# Patient Record
Sex: MALE | Race: WHITE | NOT HISPANIC OR LATINO | URBAN - METROPOLITAN AREA
[De-identification: names, ages, dates, MRNs, and addresses within clinical notes are randomized per-mention and may not be internally consistent; named-entity substitution may affect disease eponyms.]

---

## 2023-08-05 LAB
ALBUMIN (G/DL) IN SER/PLAS: 4.1 G/DL (ref 3.4–5)
ANION GAP IN SER/PLAS: 11 MMOL/L (ref 10–20)
BASOPHILS (10*3/UL) IN BLOOD BY AUTOMATED COUNT: 0.02 X10E9/L (ref 0–0.1)
BASOPHILS/100 LEUKOCYTES IN BLOOD BY AUTOMATED COUNT: 0.3 % (ref 0–2)
CALCIUM (MG/DL) IN SER/PLAS: 9.1 MG/DL (ref 8.6–10.3)
CARBON DIOXIDE, TOTAL (MMOL/L) IN SER/PLAS: 32 MMOL/L (ref 21–32)
CHLORIDE (MMOL/L) IN SER/PLAS: 103 MMOL/L (ref 98–107)
CREATININE (MG/DL) IN SER/PLAS: 0.98 MG/DL (ref 0.5–1.3)
EOSINOPHILS (10*3/UL) IN BLOOD BY AUTOMATED COUNT: 0.38 X10E9/L (ref 0–0.7)
EOSINOPHILS/100 LEUKOCYTES IN BLOOD BY AUTOMATED COUNT: 4.8 % (ref 0–6)
ERYTHROCYTE DISTRIBUTION WIDTH (RATIO) BY AUTOMATED COUNT: 14.9 % (ref 11.5–14.5)
ERYTHROCYTE MEAN CORPUSCULAR HEMOGLOBIN CONCENTRATION (G/DL) BY AUTOMATED: 31.2 G/DL (ref 32–36)
ERYTHROCYTE MEAN CORPUSCULAR VOLUME (FL) BY AUTOMATED COUNT: 91 FL (ref 80–100)
ERYTHROCYTES (10*6/UL) IN BLOOD BY AUTOMATED COUNT: 5.27 X10E12/L (ref 4.5–5.9)
GFR MALE: >90 ML/MIN/1.73M2
GLUCOSE (MG/DL) IN SER/PLAS: 158 MG/DL (ref 74–99)
HEMATOCRIT (%) IN BLOOD BY AUTOMATED COUNT: 47.8 % (ref 41–52)
HEMOGLOBIN (G/DL) IN BLOOD: 14.9 G/DL (ref 13.5–17.5)
IMMATURE GRANULOCYTES/100 LEUKOCYTES IN BLOOD BY AUTOMATED COUNT: 0.3 % (ref 0–0.9)
INR IN PPP BY COAGULATION ASSAY: 1 (ref 0.9–1.1)
LEUKOCYTES (10*3/UL) IN BLOOD BY AUTOMATED COUNT: 7.9 X10E9/L (ref 4.4–11.3)
LYMPHOCYTES (10*3/UL) IN BLOOD BY AUTOMATED COUNT: 1.66 X10E9/L (ref 1.2–4.8)
LYMPHOCYTES/100 LEUKOCYTES IN BLOOD BY AUTOMATED COUNT: 20.9 % (ref 13–44)
MONOCYTES (10*3/UL) IN BLOOD BY AUTOMATED COUNT: 0.63 X10E9/L (ref 0.1–1)
MONOCYTES/100 LEUKOCYTES IN BLOOD BY AUTOMATED COUNT: 7.9 % (ref 2–10)
NEUTROPHILS (10*3/UL) IN BLOOD BY AUTOMATED COUNT: 5.23 X10E9/L (ref 1.2–7.7)
NEUTROPHILS/100 LEUKOCYTES IN BLOOD BY AUTOMATED COUNT: 65.8 % (ref 40–80)
NRBC (PER 100 WBCS) BY AUTOMATED COUNT: 0 /100 WBC (ref 0–0)
PHOSPHATE (MG/DL) IN SER/PLAS: 3 MG/DL (ref 2.5–4.9)
PLATELETS (10*3/UL) IN BLOOD AUTOMATED COUNT: 260 X10E9/L (ref 150–450)
POTASSIUM (MMOL/L) IN SER/PLAS: 4.3 MMOL/L (ref 3.5–5.3)
PROTHROMBIN TIME (PT) IN PPP BY COAGULATION ASSAY: 11.5 SEC (ref 9.8–12.8)
SODIUM (MMOL/L) IN SER/PLAS: 142 MMOL/L (ref 136–145)
UREA NITROGEN (MG/DL) IN SER/PLAS: 21 MG/DL (ref 6–23)

## 2023-08-07 ENCOUNTER — HOSPITAL ENCOUNTER (OUTPATIENT)
Dept: DATA CONVERSION | Facility: HOSPITAL | Age: 53
End: 2023-08-07
Attending: INTERNAL MEDICINE | Admitting: INTERNAL MEDICINE
Payer: COMMERCIAL

## 2023-08-07 DIAGNOSIS — I50.9 HEART FAILURE, UNSPECIFIED (MULTI): ICD-10-CM

## 2023-08-07 DIAGNOSIS — J44.9 CHRONIC OBSTRUCTIVE PULMONARY DISEASE, UNSPECIFIED (MULTI): ICD-10-CM

## 2023-08-07 DIAGNOSIS — I25.10 ATHEROSCLEROTIC HEART DISEASE OF NATIVE CORONARY ARTERY WITHOUT ANGINA PECTORIS: ICD-10-CM

## 2023-08-07 DIAGNOSIS — I73.89 OTHER SPECIFIED PERIPHERAL VASCULAR DISEASES (CMS-HCC): ICD-10-CM

## 2023-08-07 DIAGNOSIS — I70.229 ATHEROSCLEROSIS OF NATIVE ARTERIES OF EXTREMITIES WITH REST PAIN, UNSPECIFIED EXTREMITY (MULTI): ICD-10-CM

## 2023-08-07 DIAGNOSIS — G47.33 OBSTRUCTIVE SLEEP APNEA (ADULT) (PEDIATRIC): ICD-10-CM

## 2023-08-07 LAB
ANTICARDIOLIPIN IGA ANTIBODY: <0.5 APL U/ML (ref 0–20)
ANTICARDIOLIPIN IGG ANTIBODY: <1.6 GPL U/ML (ref 0–20)
ANTICARDIOLIPIN IGM ANTIBODY: 0.2 MPL U/ML (ref 0–20)
BETA 2 GLYCOPROTEIN 1 IGA AB IN SERUM: <0.6 U/ML (ref 0–20)
BETA 2 GLYCOPROTEIN 1 IGG AB IN SERUM: <1.4 U/ML (ref 0–20)
BETA 2 GLYCOPROTEIN 1 IGM ANTIBODY IN SERUM: 0.2 U/ML (ref 0–20)
POCT GLUCOSE: 125 MG/DL (ref 74–99)

## 2023-08-08 LAB
DILUTE RUSSELL VIPER VENOM TIME CONF: NORMAL
DILUTE RUSSELL VIPER VENOM TIME SCREEN: NORMAL
DILUTE RUSSELL VIPER VENOM TIME TEST RATIO: NORMAL
LUPUS ANTICOAGULANT INTERPRETATION: NORMAL
NORMALIZED SILICA CLOTTING TIME (RATIO) OF PPP: NORMAL
PROTEIN C ACTUAL/NORMAL IN PPP BY COAGULATION ASSAY: 102 %ACTIVITY (ref 70–150)
PROTEIN S ACTUAL/NORMAL IN PPP BY COAGULATION ASSAY: >150 %ACTIVITY (ref 64–150)
SILICA CLOTTING TIME CONFIRMATION: NORMAL
SILICA CLOTTING TIME SCREEN: NORMAL

## 2023-08-11 LAB
POC ACTIVATED CLOTTING TIME LOW RANGE: 232 SECONDS (ref 89–169)
POC ACTIVATED CLOTTING TIME LOW RANGE: 261 SECONDS (ref 89–169)
POC ACTIVATED CLOTTING TIME LOW RANGE: 261 SECONDS (ref 89–169)
POC ACTIVATED CLOTTING TIME LOW RANGE: 275 SECONDS (ref 89–169)
POC ACTIVATED CLOTTING TIME LOW RANGE: 306 SECONDS (ref 89–169)

## 2023-08-23 LAB
ATRIAL RATE: 79 BPM
P AXIS: 29 DEGREES
P OFFSET: 189 MS
P ONSET: 140 MS
PR INTERVAL: 172 MS
Q ONSET: 226 MS
QRS COUNT: 13 BEATS
QRS DURATION: 74 MS
QT INTERVAL: 378 MS
QTC CALCULATION(BAZETT): 433 MS
QTC FREDERICIA: 414 MS
R AXIS: 48 DEGREES
T AXIS: 27 DEGREES
T OFFSET: 415 MS
VENTRICULAR RATE: 79 BPM

## 2023-09-29 DIAGNOSIS — R06.02 SHORTNESS OF BREATH: ICD-10-CM

## 2023-09-29 DIAGNOSIS — G71.02: ICD-10-CM

## 2023-09-29 DIAGNOSIS — G71.02: Primary | ICD-10-CM

## 2023-09-29 DIAGNOSIS — R42 DIZZINESS: ICD-10-CM

## 2023-09-29 DIAGNOSIS — I25.10 CORONARY ARTERY DISEASE, UNSPECIFIED VESSEL OR LESION TYPE, UNSPECIFIED WHETHER ANGINA PRESENT, UNSPECIFIED WHETHER NATIVE OR TRANSPLANTED HEART: ICD-10-CM

## 2023-09-30 NOTE — H&P
History & Physical Reviewed:   I have reviewed the History and Physical dated:  04-Aug-2023   History and Physical reviewed and relevant findings noted. Patient examined to review pertinent physical  findings.: No significant changes   Home Medications Reviewed: no changes noted   Allergies Reviewed: no changes noted       Airway/Sedation Assessment:  ·  Emotional Status calm   ·  Neurologic alert & oriented x 3   ·  Respiratory clear to auscultation   ·  Cardiovascular rhythm & rate regular   ·  GI/ soft, nontender     · Pulses present: Pedal Left, Pedal Right, Radial Left, Radial Right    AS [facility] phys assess pulse FT Left radial pulse doppled.     ·  Mouth Opening OK yes   ·  Neck Flexibility OK yes   ·  Loose Teeth no     Oropharyngeal Classification:          ·  Oropharyngeal Classification Class II   ·  ASA PS Classification ASA III   ·  Sedation Plan moderate sedation       ERAS (Enhanced Recovery After Surgery):  ·  ERAS Patient: no     Consent:   COVID-19 Consent:  ·  COVID-19 Risk Consent Surgeon has reviewed key risks related to the risk of brice COVID-19 and if they contract COVID-19 what the risks are.       Electronic Signatures:  Rebekah Myrick (APRN-CNP)   (Signed 07-Aug-2023 08:02)   Entered: History & Physical Reviewed, Note Completion, Airway/Sedation, ERAS, Consent   Authored: History & Physical Reviewed, Airway/Sedation, ERAS, Consent, Note Completion  Js Yee)   (Signed 07-Aug-2023 15:24)   Co-Signer: History & Physical Reviewed, Note Completion, Airway/Sedation, ERAS, Consent    Last Updated: 07-Aug-2023 15:24 by Js Yee)

## 2023-10-19 ENCOUNTER — APPOINTMENT (OUTPATIENT)
Dept: CARDIOLOGY | Facility: CLINIC | Age: 53
End: 2023-10-19
Payer: COMMERCIAL

## 2023-10-19 ENCOUNTER — APPOINTMENT (OUTPATIENT)
Dept: VASCULAR MEDICINE | Facility: CLINIC | Age: 53
End: 2023-10-19
Payer: COMMERCIAL

## 2023-12-06 PROBLEM — I10 HYPERTENSION: Status: ACTIVE | Noted: 2023-12-06

## 2023-12-06 PROBLEM — S09.90XA HEAD TRAUMA: Status: ACTIVE | Noted: 2023-12-06

## 2023-12-06 PROBLEM — G21.9 SECONDARY PARKINSONISM (MULTI): Status: ACTIVE | Noted: 2023-12-06

## 2023-12-06 PROBLEM — R60.9 EDEMA: Status: ACTIVE | Noted: 2023-12-06

## 2023-12-06 PROBLEM — L30.9 DERMATITIS: Status: ACTIVE | Noted: 2023-12-06

## 2023-12-06 PROBLEM — G25.0 ESSENTIAL TREMOR: Status: ACTIVE | Noted: 2023-12-06

## 2023-12-07 ENCOUNTER — APPOINTMENT (OUTPATIENT)
Dept: CARDIOLOGY | Facility: CLINIC | Age: 53
End: 2023-12-07
Payer: COMMERCIAL

## 2023-12-08 ENCOUNTER — APPOINTMENT (OUTPATIENT)
Dept: CARDIOLOGY | Facility: CLINIC | Age: 53
End: 2023-12-08
Payer: COMMERCIAL

## 2024-01-25 ENCOUNTER — OFFICE VISIT (OUTPATIENT)
Dept: CARDIOLOGY | Facility: CLINIC | Age: 54
End: 2024-01-25
Payer: COMMERCIAL

## 2024-01-25 VITALS
WEIGHT: 315 LBS | BODY MASS INDEX: 41.75 KG/M2 | HEIGHT: 73 IN | OXYGEN SATURATION: 96 % | DIASTOLIC BLOOD PRESSURE: 80 MMHG | SYSTOLIC BLOOD PRESSURE: 132 MMHG | HEART RATE: 78 BPM

## 2024-01-25 DIAGNOSIS — I73.9 PERIPHERAL VASCULAR DISEASE, UNSPECIFIED (CMS-HCC): Primary | ICD-10-CM

## 2024-01-25 PROCEDURE — 3079F DIAST BP 80-89 MM HG: CPT | Performed by: INTERNAL MEDICINE

## 2024-01-25 PROCEDURE — 99213 OFFICE O/P EST LOW 20 MIN: CPT | Performed by: INTERNAL MEDICINE

## 2024-01-25 PROCEDURE — 3075F SYST BP GE 130 - 139MM HG: CPT | Performed by: INTERNAL MEDICINE

## 2024-01-25 RX ORDER — ICOSAPENT ETHYL 1 G/1
2 CAPSULE ORAL 2 TIMES DAILY
COMMUNITY

## 2024-01-25 RX ORDER — APIXABAN 5 MG/1
5 TABLET, FILM COATED ORAL 2 TIMES DAILY
COMMUNITY
Start: 2023-01-11 | End: 2024-01-25 | Stop reason: WASHOUT

## 2024-01-25 RX ORDER — IPRATROPIUM BROMIDE AND ALBUTEROL SULFATE 2.5; .5 MG/3ML; MG/3ML
3 SOLUTION RESPIRATORY (INHALATION)
COMMUNITY
Start: 2024-01-11

## 2024-01-25 RX ORDER — PROPRANOLOL HYDROCHLORIDE 120 MG/1
120 CAPSULE, EXTENDED RELEASE ORAL DAILY
COMMUNITY
Start: 2019-08-08

## 2024-01-25 RX ORDER — BREXPIPRAZOLE 3 MG/1
1 TABLET ORAL DAILY
COMMUNITY

## 2024-01-25 RX ORDER — FLUTICASONE PROPIONATE 50 MCG
1 SPRAY, SUSPENSION (ML) NASAL DAILY
COMMUNITY
Start: 2024-01-11

## 2024-01-25 RX ORDER — METFORMIN HYDROCHLORIDE 500 MG/1
500 TABLET ORAL
COMMUNITY
Start: 2020-01-08

## 2024-01-25 RX ORDER — LOSARTAN POTASSIUM 50 MG/1
50 TABLET ORAL DAILY
COMMUNITY
Start: 2024-01-11

## 2024-01-25 RX ORDER — BENZTROPINE MESYLATE 2 MG/1
2 TABLET ORAL 2 TIMES DAILY
COMMUNITY
Start: 2023-10-30

## 2024-01-25 RX ORDER — TIRZEPATIDE 2.5 MG/.5ML
2.5 INJECTION, SOLUTION SUBCUTANEOUS
COMMUNITY
Start: 2024-01-11

## 2024-01-25 RX ORDER — ATORVASTATIN CALCIUM 40 MG/1
40 TABLET, FILM COATED ORAL DAILY
COMMUNITY
Start: 2024-01-11

## 2024-01-25 RX ORDER — EMPAGLIFLOZIN 10 MG/1
10 TABLET, FILM COATED ORAL
COMMUNITY
Start: 2024-01-16

## 2024-01-25 RX ORDER — BUDESONIDE AND FORMOTEROL FUMARATE DIHYDRATE 160; 4.5 UG/1; UG/1
2 AEROSOL RESPIRATORY (INHALATION)
COMMUNITY
Start: 2019-08-26

## 2024-01-25 RX ORDER — LAMOTRIGINE 150 MG/1
150 TABLET ORAL DAILY
COMMUNITY
Start: 2024-01-11

## 2024-01-25 RX ORDER — PRAZOSIN HYDROCHLORIDE 2 MG/1
2 CAPSULE ORAL NIGHTLY
COMMUNITY
Start: 2021-03-15

## 2024-01-25 RX ORDER — DULAGLUTIDE 0.75 MG/.5ML
0.75 INJECTION, SOLUTION SUBCUTANEOUS
COMMUNITY
Start: 2024-01-11

## 2024-01-25 RX ORDER — CLOPIDOGREL BISULFATE 75 MG/1
75 TABLET, FILM COATED ORAL DAILY
COMMUNITY
Start: 2024-01-11

## 2024-01-25 RX ORDER — FLUOXETINE HYDROCHLORIDE 40 MG/1
80 CAPSULE ORAL DAILY
COMMUNITY

## 2024-01-25 RX ORDER — DICLOFENAC SODIUM 10 MG/G
4 GEL TOPICAL 4 TIMES DAILY PRN
COMMUNITY
Start: 2024-01-16

## 2024-01-25 RX ORDER — HYDROXYZINE HYDROCHLORIDE 50 MG/1
50 TABLET, FILM COATED ORAL 4 TIMES DAILY
COMMUNITY

## 2024-01-25 RX ORDER — GABAPENTIN 800 MG/1
800 TABLET ORAL DAILY
COMMUNITY
Start: 2024-01-24

## 2024-01-25 NOTE — PATIENT INSTRUCTIONS
Finish out your bottle of eliquis  Keep taking clopidogrel    Need echocardiogram of the heart with follow up

## 2024-01-25 NOTE — PROGRESS NOTES
PCP: Elba Wade MD    Subjective   Savage Katz is a 54 y.o. male who is here for follow up of  radial artery occlusion .  Since last visit, reports stability in symptoms.  Denies chest pain and SOB. Patient does not report any numbness or tingling or pain in the left arm following the intervention.       Review of Systems:  Otherwise, limited cardiovascular review of systems is negative.    Past Medical History:  He has no past medical history on file.    Surgical History:   He has no past surgical history on file.    Family History:   No family history on file.No family history on file.    Social History:   Tobacco Use: Low Risk  (1/25/2024)    Patient History     Smoking Tobacco Use: Never     Smokeless Tobacco Use: Never     Passive Exposure: Not on file       Outpatient Medications:    Current Outpatient Medications:     atorvastatin (Lipitor) 40 mg tablet, Take 1 tablet (40 mg) by mouth once daily., Disp: , Rfl:     benztropine (Cogentin) 2 mg tablet, Take 1 tablet (2 mg) by mouth 2 times a day., Disp: , Rfl:     diclofenac sodium (Voltaren) 1 % gel gel, Apply 1 Application topically 4 times a day as needed., Disp: , Rfl:     Eliquis 5 mg tablet, Take 1 tablet (5 mg) by mouth 2 times a day., Disp: , Rfl:     FLUoxetine (PROzac) 40 mg capsule, Take 2 capsules (80 mg) by mouth once daily., Disp: , Rfl:     fluticasone (Flonase) 50 mcg/actuation nasal spray, Administer 1 spray into each nostril once daily., Disp: , Rfl:     gabapentin (Neurontin) 800 mg tablet, Take 1 tablet (800 mg) by mouth once daily., Disp: , Rfl:     hydrOXYzine HCL (Atarax) 50 mg tablet, Take 1 tablet (50 mg) by mouth 4 times a day., Disp: , Rfl:     icosapent ethyL (Vascepa) 1 gram capsule, Take 2 capsules (2 g) by mouth 2 times a day., Disp: , Rfl:     ipratropium-albuteroL (Duo-Neb) 0.5-2.5 mg/3 mL nebulizer solution, Take 3 mL by nebulization 4 times a day., Disp: , Rfl:     Jardiance 10 mg, Take 1 tablet (10 mg) by mouth once  "daily with breakfast., Disp: , Rfl:     lamoTRIgine (LaMICtal) 150 mg tablet, Take 1 tablet (150 mg) by mouth once daily., Disp: , Rfl:     losartan (Cozaar) 50 mg tablet, Take 1 tablet (50 mg) by mouth once daily., Disp: , Rfl:     metFORMIN (Glucophage) 500 mg tablet, Take 1 tablet (500 mg) by mouth 2 times a day with meals., Disp: , Rfl:     Plavix 75 mg tablet, Take 1 tablet (75 mg) by mouth once daily., Disp: , Rfl:     prazosin (Minipress) 2 mg capsule, Take 1 capsule (2 mg) by mouth once daily at bedtime., Disp: , Rfl:     propranolol LA (Inderal LA) 120 mg 24 hr capsule, Take 1 capsule (120 mg) by mouth once daily., Disp: , Rfl:     Rexulti 3 mg tablet, Take 1 tablet by mouth once daily., Disp: , Rfl:     Symbicort 160-4.5 mcg/actuation inhaler, Inhale 2 puffs 2 times a day., Disp: , Rfl:     tirzepatide (Mounjaro) 2.5 mg/0.5 mL pen injector, Inject 2.5 mg under the skin 1 (one) time per week., Disp: , Rfl:     Trulicity 0.75 mg/0.5 mL pen injector, 0.75 mg., Disp: , Rfl:      Allergies:  Patient has no known allergies.       Objective   Vital Signs:  /80 (BP Location: Left arm, Patient Position: Sitting, BP Cuff Size: Large adult)   Pulse 78   Ht 1.854 m (6' 1\")   Wt (!) 154 kg (340 lb 9.6 oz)   SpO2 96%   BMI 44.94 kg/m²     Physical Exam:  General: no acute distress  HEENT: EOMI, no scleral icterus.  Lungs: Clear to auscultation bilaterally without wheezing, rales, or rhonchi.  Cardiovascular: Regular rhythm and rate. Normal S1 and S2. No murmurs, rubs, or gallops are appreciated. JVP normal.  Abdomen: Soft, nontender, nondistended. Bowel sounds present.  Extremities: LUE 1+ radial pulse.  Neurologic: Alert and oriented x3.      Laboratory values:  CMP:  Recent Labs     08/05/23  1010      K 4.3      CO2 32   ANIONGAP 11   BUN 21   CREATININE 0.98     Recent Labs     08/05/23  1010   ALBUMIN 4.1     CBC:  Recent Labs     08/05/23  1010   WBC 7.9   HGB 14.9   HCT 47.8      MCV " 91     COAG:   Recent Labs     08/05/23  1010   INR 1.0        I have personally reviewed most recent PCP, cardiology, vascular, and/or podiatry documentation.      Assessment/Plan   54 y.o. male with LUE ischemia s/p intervention and improvement. Chart history of CAD, CHF (?[facility]), HTN, obesity, ANDI, COPD.  History of RUE ischemia treated conservatively at [facility] [place] years ago.     APLA testing is negative.  At last visit, we ordered echo with bubble evaluation for PFO, carotid US and renal artery duple for FMD, and CTA coronary for VINCENT and ?CHF. However, none of the studies performed d/t work schedule.    Plan:  Ok to stop Eliquis after current bottle complete  Continue Plavix  Needs at least echocardiogram with bubble evaluation as previously ordered.  Follow up 6 months.      I was present with the PA student who participated in the documentation of this note. I have personally seen and re-examined the patient and performed the medical decision-making components (assessment and plan of care). I have reviewed the PA student documentation and verified the findings in the note as written with additions or exceptions as stated in the body of this note.    Js Yee MD           SIGNATURE: VI PRINGLE PATIENT NAME: Savage Katz   DATE/TIME: January 25, 2024 3:27 PM MRN: [id number]

## 2024-04-25 DIAGNOSIS — I73.9 PERIPHERAL VASCULAR DISEASE, UNSPECIFIED (CMS-HCC): ICD-10-CM

## 2024-04-25 RX ORDER — CREAM BASE NO.171
1 CREAM (GRAM) MISCELLANEOUS EVERY 6 HOURS
COMMUNITY
End: 2024-04-25 | Stop reason: SDUPTHER

## 2024-04-25 RX ORDER — CREAM BASE NO.171
1 CREAM (GRAM) MISCELLANEOUS EVERY 6 HOURS
Qty: 180 G | Refills: 11 | Status: SHIPPED | OUTPATIENT
Start: 2024-04-25 | End: 2025-04-25

## 2024-07-08 PROBLEM — R25.1 TREMOR: Status: ACTIVE | Noted: 2024-07-08

## 2024-07-08 PROBLEM — G20.A1 PARKINSON'S DISEASE (MULTI): Status: ACTIVE | Noted: 2019-12-03

## 2024-07-08 PROBLEM — E78.2 MIXED HYPERLIPIDEMIA: Status: ACTIVE | Noted: 2020-02-25

## 2024-07-08 PROBLEM — G47.33 OBSTRUCTIVE SLEEP APNEA SYNDROME: Status: ACTIVE | Noted: 2024-07-08

## 2024-07-08 PROBLEM — I73.00 RAYNAUD'S DISEASE: Status: ACTIVE | Noted: 2020-02-25

## 2024-07-08 PROBLEM — E66.9 OBESITY: Status: ACTIVE | Noted: 2024-07-08

## 2024-07-08 PROBLEM — J44.9 CHRONIC OBSTRUCTIVE PULMONARY DISEASE (MULTI): Status: ACTIVE | Noted: 2020-01-15

## 2024-07-08 PROBLEM — R06.09 DYSPNEA ON EXERTION: Status: ACTIVE | Noted: 2024-07-08

## 2024-07-08 PROBLEM — I50.9 CHF (CONGESTIVE HEART FAILURE) (MULTI): Status: ACTIVE | Noted: 2024-07-08

## 2024-07-08 PROBLEM — E11.9 TYPE 2 DIABETES MELLITUS (MULTI): Status: ACTIVE | Noted: 2020-01-15

## 2024-07-08 PROBLEM — F31.9 BIPOLAR ILLNESS (MULTI): Status: ACTIVE | Noted: 2024-07-08

## 2024-07-08 PROBLEM — I25.10 ATHEROSCLEROSIS OF CORONARY ARTERY: Status: ACTIVE | Noted: 2024-07-08

## 2024-07-08 PROBLEM — E55.9 VITAMIN D DEFICIENCY: Status: ACTIVE | Noted: 2020-07-17

## 2024-07-08 PROBLEM — D80.4: Status: ACTIVE | Noted: 2020-02-25

## 2024-07-08 PROBLEM — I99.8 ISCHEMIA OF HAND: Status: ACTIVE | Noted: 2024-07-08

## 2024-07-25 ENCOUNTER — APPOINTMENT (OUTPATIENT)
Dept: CARDIOLOGY | Facility: CLINIC | Age: 54
End: 2024-07-25
Payer: COMMERCIAL

## 2024-09-19 ENCOUNTER — APPOINTMENT (OUTPATIENT)
Dept: CARDIOLOGY | Facility: CLINIC | Age: 54
End: 2024-09-19
Payer: COMMERCIAL

## 2024-11-05 DIAGNOSIS — I73.9 PERIPHERAL VASCULAR DISEASE, UNSPECIFIED (CMS-HCC): ICD-10-CM

## 2024-11-06 RX ORDER — CLOPIDOGREL BISULFATE 75 MG/1
75 TABLET ORAL DAILY
Qty: 90 TABLET | Refills: 3 | Status: SHIPPED | OUTPATIENT
Start: 2024-11-06 | End: 2025-11-06

## 2025-06-20 PROCEDURE — 89190 NASAL SMEAR FOR EOSINOPHILS: CPT

## 2025-06-23 ENCOUNTER — LAB REQUISITION (OUTPATIENT)
Dept: LAB | Facility: HOSPITAL | Age: 55
End: 2025-06-23
Payer: COMMERCIAL

## 2025-06-23 LAB
EOSINOPHIL SMEAR: ABNORMAL
HOLD SPECIMEN: NORMAL

## 2025-06-24 ENCOUNTER — PATIENT OUTREACH (OUTPATIENT)
Dept: CARE COORDINATION | Facility: CLINIC | Age: 55
End: 2025-06-24
Payer: COMMERCIAL

## 2025-06-24 NOTE — PROGRESS NOTES
Outreach call to patient to support a smooth transition of care from recent admission. Unable to reach patient or lvm. Will continue to follow through transition period.    Sayra Bailey RN, [facility] [place]  Phone [phone]

## 2025-07-09 ENCOUNTER — PATIENT OUTREACH (OUTPATIENT)
Dept: CARE COORDINATION | Facility: CLINIC | Age: 55
End: 2025-07-09
Payer: COMMERCIAL

## 2025-07-09 NOTE — PROGRESS NOTES
Outreach call to patient for 2 week CM follow up. Unable to reach patient or lvm. Will continue to follow through transition period.    Sayra Bailey RN, [facility] [place]  Phone [phone]

## 2025-07-24 ENCOUNTER — PATIENT OUTREACH (OUTPATIENT)
Dept: CARE COORDINATION | Facility: CLINIC | Age: 55
End: 2025-07-24
Payer: COMMERCIAL

## 2025-07-24 NOTE — PROGRESS NOTES
Outreach call to patient to support a smooth transition of care from recent re-admission.  Left voicemail message for patient with my contact information. Will continue to follow through transition period.    Sayra Bailey RN, [facility] [place]  Phone [phone]

## 2025-08-19 ENCOUNTER — PATIENT OUTREACH (OUTPATIENT)
Dept: CARE COORDINATION | Facility: CLINIC | Age: 55
End: 2025-08-19
Payer: COMMERCIAL